# Patient Record
Sex: FEMALE | Race: BLACK OR AFRICAN AMERICAN
[De-identification: names, ages, dates, MRNs, and addresses within clinical notes are randomized per-mention and may not be internally consistent; named-entity substitution may affect disease eponyms.]

---

## 2018-10-21 ENCOUNTER — HOSPITAL ENCOUNTER (EMERGENCY)
Dept: HOSPITAL 53 - M ED | Age: 23
Discharge: HOME | End: 2018-10-21
Payer: COMMERCIAL

## 2018-10-21 DIAGNOSIS — O99.512: Primary | ICD-10-CM

## 2018-10-21 DIAGNOSIS — J02.9: ICD-10-CM

## 2018-10-21 DIAGNOSIS — E86.0: ICD-10-CM

## 2018-10-21 DIAGNOSIS — Z3A.15: ICD-10-CM

## 2018-10-21 PROCEDURE — 87086 URINE CULTURE/COLONY COUNT: CPT

## 2019-03-08 ENCOUNTER — HOSPITAL ENCOUNTER (OUTPATIENT)
Dept: HOSPITAL 53 - M LDO | Age: 24
LOS: 1 days | Discharge: HOME | End: 2019-03-09
Attending: OBSTETRICS & GYNECOLOGY
Payer: COMMERCIAL

## 2019-03-08 VITALS — BODY MASS INDEX: 28.45 KG/M2 | WEIGHT: 166.67 LBS | HEIGHT: 64 IN

## 2019-03-08 VITALS — DIASTOLIC BLOOD PRESSURE: 64 MMHG | SYSTOLIC BLOOD PRESSURE: 117 MMHG

## 2019-03-08 VITALS — SYSTOLIC BLOOD PRESSURE: 118 MMHG | DIASTOLIC BLOOD PRESSURE: 58 MMHG

## 2019-03-08 DIAGNOSIS — O34.63: ICD-10-CM

## 2019-03-08 DIAGNOSIS — O26.853: Primary | ICD-10-CM

## 2019-03-08 DIAGNOSIS — A74.9: ICD-10-CM

## 2019-03-08 DIAGNOSIS — O98.319: ICD-10-CM

## 2019-03-08 DIAGNOSIS — Z3A.34: ICD-10-CM

## 2019-03-08 LAB
AMPHETAMINES UR QL SCN: NEGATIVE
APPEARANCE UR: CLEAR
BACTERIA UR QL AUTO: NEGATIVE
BARBITURATES UR QL SCN: NEGATIVE
BENZODIAZ UR QL SCN: NEGATIVE
BILIRUB UR QL STRIP.AUTO: NEGATIVE
BZE UR QL SCN: NEGATIVE
CANNABINOIDS UR QL SCN: NEGATIVE
CHLAMYDIA DNA AMPLIFICATION: POSITIVE
GLUCOSE UR QL STRIP.AUTO: NEGATIVE MG/DL
HGB UR QL STRIP.AUTO: NEGATIVE
KETONES UR QL STRIP.AUTO: NEGATIVE MG/DL
LEUKOCYTE ESTERASE UR QL STRIP.AUTO: (no result)
METHADONE UR QL SCN: NEGATIVE
MUCOUS THREADS URNS QL MICRO: (no result)
N GONORRHOEA RRNA SPEC QL NAA+PROBE: NEGATIVE
NITRITE UR QL STRIP.AUTO: NEGATIVE
OPIATES UR QL SCN: NEGATIVE
PCP UR QL SCN: NEGATIVE
PH UR STRIP.AUTO: 7 UNITS (ref 5–9)
PROT UR QL STRIP.AUTO: NEGATIVE MG/DL
RBC # UR AUTO: 0 /HPF (ref 0–3)
SP GR UR STRIP.AUTO: 1.02 (ref 1–1.03)
SQUAMOUS #/AREA URNS AUTO: 1 /HPF (ref 0–6)
UROBILINOGEN UR QL STRIP.AUTO: 0.2 MG/DL (ref 0–2)
WBC #/AREA URNS AUTO: 1 /HPF (ref 0–3)

## 2019-03-08 PROCEDURE — 81001 URINALYSIS AUTO W/SCOPE: CPT

## 2019-03-08 PROCEDURE — 80307 DRUG TEST PRSMV CHEM ANLYZR: CPT

## 2019-03-08 PROCEDURE — 85025 COMPLETE CBC W/AUTO DIFF WBC: CPT

## 2019-03-08 PROCEDURE — 87086 URINE CULTURE/COLONY COUNT: CPT

## 2019-03-08 PROCEDURE — 96372 THER/PROPH/DIAG INJ SC/IM: CPT

## 2019-03-08 PROCEDURE — 85460 HEMOGLOBIN FETAL: CPT

## 2019-03-08 PROCEDURE — 87591 N.GONORRHOEAE DNA AMP PROB: CPT

## 2019-03-08 PROCEDURE — 85384 FIBRINOGEN ACTIVITY: CPT

## 2019-03-08 PROCEDURE — 87081 CULTURE SCREEN ONLY: CPT

## 2019-03-08 PROCEDURE — 59025 FETAL NON-STRESS TEST: CPT

## 2019-03-08 PROCEDURE — 76815 OB US LIMITED FETUS(S): CPT

## 2019-03-08 PROCEDURE — 87491 CHLMYD TRACH DNA AMP PROBE: CPT

## 2019-03-08 PROCEDURE — 36415 COLL VENOUS BLD VENIPUNCTURE: CPT

## 2019-03-08 PROCEDURE — 76820 UMBILICAL ARTERY ECHO: CPT

## 2019-03-08 PROCEDURE — 85610 PROTHROMBIN TIME: CPT

## 2019-03-08 PROCEDURE — 96374 THER/PROPH/DIAG INJ IV PUSH: CPT

## 2019-03-08 PROCEDURE — 76817 TRANSVAGINAL US OBSTETRIC: CPT

## 2019-03-08 RX ADMIN — BETAMETHASONE SODIUM PHOSPHATE AND BETAMETHASONE ACETATE SCH MG: 3; 3 INJECTION, SUSPENSION INTRA-ARTICULAR; INTRALESIONAL; INTRAMUSCULAR at 17:01

## 2019-03-08 RX ADMIN — METRONIDAZOLE SCH MG: 500 TABLET ORAL at 16:55

## 2019-03-09 VITALS — SYSTOLIC BLOOD PRESSURE: 107 MMHG | DIASTOLIC BLOOD PRESSURE: 51 MMHG

## 2019-03-09 VITALS — SYSTOLIC BLOOD PRESSURE: 115 MMHG | DIASTOLIC BLOOD PRESSURE: 57 MMHG

## 2019-03-09 VITALS — SYSTOLIC BLOOD PRESSURE: 109 MMHG | DIASTOLIC BLOOD PRESSURE: 55 MMHG

## 2019-03-09 LAB
BASOPHILS # BLD AUTO: 0 10^3/UL (ref 0–0.2)
BASOPHILS NFR BLD AUTO: 0.1 % (ref 0–1)
EOSINOPHIL # BLD AUTO: 0 10^3/UL (ref 0–0.5)
EOSINOPHIL NFR BLD AUTO: 0.2 % (ref 0–3)
HCT VFR BLD AUTO: 29.4 % (ref 36–47)
HGB BLD-MCNC: 9.5 G/DL (ref 12–15.5)
INR PPP: 1.19
LYMPHOCYTES # BLD AUTO: 1.2 10^3/UL (ref 1.5–6.5)
LYMPHOCYTES NFR BLD AUTO: 10.9 % (ref 24–44)
MCH RBC QN AUTO: 29.3 PG (ref 27–33)
MCHC RBC AUTO-ENTMCNC: 32.3 G/DL (ref 32–36.5)
MCV RBC AUTO: 90.7 FL (ref 80–96)
MONOCYTES # BLD AUTO: 0.8 10^3/UL (ref 0–0.8)
MONOCYTES NFR BLD AUTO: 7.1 % (ref 0–5)
NEUTROPHILS # BLD AUTO: 9.2 10^3/UL (ref 1.8–7.7)
NEUTROPHILS NFR BLD AUTO: 81.1 % (ref 36–66)
PLATELET # BLD AUTO: 234 10^3/UL (ref 150–450)
PROTHROMBIN TIME: 15.3 SECONDS (ref 12.1–14.4)
RBC # BLD AUTO: 3.24 10^6/UL (ref 4–5.4)
WBC # BLD AUTO: 11.4 10^3/UL (ref 4–10)

## 2019-03-09 RX ADMIN — METRONIDAZOLE SCH MG: 500 TABLET ORAL at 08:43

## 2019-03-09 RX ADMIN — BETAMETHASONE SODIUM PHOSPHATE AND BETAMETHASONE ACETATE SCH MG: 3; 3 INJECTION, SUSPENSION INTRA-ARTICULAR; INTRALESIONAL; INTRAMUSCULAR at 16:53

## 2019-03-09 RX ADMIN — METRONIDAZOLE SCH MG: 500 TABLET ORAL at 22:25

## 2019-03-09 NOTE — NUR
2230 hours  uterine irritability resolving category 1 strip repeat bk negative 
patient discharged to follow up at clinic

## 2019-03-09 NOTE — NUR
1800 hours review status Patient expressed wanting to go home  however 
explained although contractions spaced out etiology unknown however B-K evaluati
on suspect microscopic bleed normal value is .[id number] patient's value is .0005. 
As well no growth on urine culture. Reinforced need for pelvic rest hydration 
and crys in 8-10 days 20 minutes face to face educational component

## 2019-03-09 NOTE — REP
STAT obstetric ultrasound for placental abruptio:

There are no comparisons.

There is a single intrauterine gestation in a vertex presentation.

The fetal heart rate is 131 beats per minute.

The placenta is anterior with grade 2 maturity.  There is no placenta previa.  No

placental abruptio is identified.

The amniotic fluid volume subjectively is normal.

The amniotic fluid index is 9.5 (7.9 - 24.9).

Cervix measures 3.6 cm length.

Gestational age by LMP is 35 weeks 0 days/KAN 04/13/2019.

Umbilical artery Doppler assessment:

S/D ratio         2.27 (2.30-3.30)

Resistive Index   0.56 (0.59-0.75

Diastolic Velocity 24.2 (>10 cm/sec)

Impression:

There is no placental abruptio.

There is no placenta previa.

The placenta is grade two maturity.

 

 

Electronically Signed by

Luciano Bagley MD 03/09/2019 11:31 A

## 2019-03-09 NOTE — IPN
DATE:  2019

 

This lady is a 23-year-old,  1, para 0 at 34 and 6 weeks of gestation who

comes with a history of spotting, contractions.  No vaginal bleeding or loss.

She has lower abdominal cramps.  Her past history is that she had a positive for

chlamydia, which test of cure was negative.  Now she has increasing vaginal

discharge.  She was positive for chlamydia and positive for bacterial vaginosis

(BV).  Her partner declined to take medications for chlamydia; however, we

ordered medications for the patient through the hospital and ordered through our

flex machine for the  and observed both of them taking the medication.

She has also given Flagyl for her BV, and her urine evaluation showed urine was

1016, pH of 7, positive for leukocyte esterase but no bacteria and no blood.  We

are awaiting and pending the culture and sensitivity.  The patient will need a

test of cure 7-10 days out from the present admission.  She is also having

steroids for lung enhancement, as she is 34 and 6, and she will be complete at

1700 hours today, 2019.  She has a category 1 strip with occasional reduced

variability.  No decelerations are noted, and she is having some tightenings on

the fetal monitor.

 

Her temperature is 99.1, respirations are 18, pulse is 86, and blood pressure

117/64. Hemoglobin shows she has anemia at 9.5, hematocrit 29.4, platelets are

234.  Coagulation follow profile is negative.  Blaine-Ramiro is pending.

 

Our plan of management is the urine for culture and sensitivity (C and S),

ultrasound to evaluate concealed abruption, amniotic fluid index (ENZO) and

cervical length. Blaine-  Ramiro is pending.  Chlamydia was treated and test of

cure in 7-10 days.  BV is being treated.  She was for possible discharge after

steroid complete.  She was counseled regarding absolutely pelvic rest.  No

intercourse until test of cure is done.  The patient and  expressed

understanding of the plan of care.  Emphasized that with chlamydia the baby could

possibly be affected if it is still positive, and blindness is one of the key

things that happens with chlamydia.  The patient again expressed understanding of

the reason for antibiotics and no intercourse for the next 7-10 days.  We are

awaiting the ultrasound report as well as Ml. We will reassess again

in several hours' time. We had a 30-minute discussion with questions and

educational component.